# Patient Record
Sex: MALE | Race: BLACK OR AFRICAN AMERICAN | Employment: PART TIME | URBAN - METROPOLITAN AREA
[De-identification: names, ages, dates, MRNs, and addresses within clinical notes are randomized per-mention and may not be internally consistent; named-entity substitution may affect disease eponyms.]

---

## 2023-02-01 ENCOUNTER — ANESTHESIA EVENT (OUTPATIENT)
Dept: SURGERY | Age: 23
End: 2023-02-01
Payer: MEDICAID

## 2023-02-01 ENCOUNTER — HOSPITAL ENCOUNTER (INPATIENT)
Age: 23
LOS: 2 days | Discharge: HOME | End: 2023-02-03
Attending: EMERGENCY MEDICINE | Admitting: SURGERY
Payer: MEDICAID

## 2023-02-01 ENCOUNTER — ANESTHESIA (OUTPATIENT)
Dept: SURGERY | Age: 23
End: 2023-02-01
Payer: MEDICAID

## 2023-02-01 ENCOUNTER — APPOINTMENT (OUTPATIENT)
Dept: ULTRASOUND IMAGING | Age: 23
End: 2023-02-01
Attending: NURSE PRACTITIONER
Payer: MEDICAID

## 2023-02-01 ENCOUNTER — APPOINTMENT (OUTPATIENT)
Dept: CT IMAGING | Age: 23
End: 2023-02-01
Attending: NURSE PRACTITIONER
Payer: MEDICAID

## 2023-02-01 DIAGNOSIS — K61.0 PERIANAL ABSCESS: Primary | ICD-10-CM

## 2023-02-01 DIAGNOSIS — G89.18 POST-OPERATIVE PAIN: ICD-10-CM

## 2023-02-01 DIAGNOSIS — A41.9 SEPSIS WITHOUT ACUTE ORGAN DYSFUNCTION, DUE TO UNSPECIFIED ORGANISM (HCC): ICD-10-CM

## 2023-02-01 PROBLEM — K61.1 PERIRECTAL ABSCESS: Status: ACTIVE | Noted: 2023-02-01

## 2023-02-01 LAB
ALBUMIN SERPL-MCNC: 3.8 G/DL (ref 3.5–5)
ALBUMIN/GLOB SERPL: 0.7 (ref 1.1–2.2)
ALP SERPL-CCNC: 82 U/L (ref 45–117)
ALT SERPL-CCNC: 30 U/L (ref 12–78)
ANION GAP SERPL CALC-SCNC: 7 MMOL/L (ref 5–15)
APPEARANCE UR: ABNORMAL
AST SERPL W P-5'-P-CCNC: 44 U/L (ref 15–37)
BACTERIA URNS QL MICRO: NEGATIVE /HPF
BASOPHILS # BLD: 0.1 K/UL (ref 0–0.1)
BASOPHILS NFR BLD: 0 % (ref 0–1)
BILIRUB DIRECT SERPL-MCNC: 0.1 MG/DL (ref 0–0.2)
BILIRUB SERPL-MCNC: 2.1 MG/DL (ref 0.2–1)
BILIRUB UR QL: NEGATIVE
BUN SERPL-MCNC: 9 MG/DL (ref 6–20)
BUN/CREAT SERPL: 11 (ref 12–20)
CA-I BLD-MCNC: 9.7 MG/DL (ref 8.5–10.1)
CHLORIDE SERPL-SCNC: 103 MMOL/L (ref 97–108)
CO2 SERPL-SCNC: 21 MMOL/L (ref 21–32)
COLOR UR: ABNORMAL
CREAT SERPL-MCNC: 0.83 MG/DL (ref 0.7–1.3)
CRP SERPL-MCNC: 15.8 MG/DL (ref 0–0.6)
DIFFERENTIAL METHOD BLD: ABNORMAL
EOSINOPHIL # BLD: 0 K/UL (ref 0–0.4)
EOSINOPHIL NFR BLD: 0 % (ref 0–7)
EPITH CASTS URNS QL MICRO: ABNORMAL /LPF
ERYTHROCYTE [DISTWIDTH] IN BLOOD BY AUTOMATED COUNT: 12.8 % (ref 11.5–14.5)
GLOBULIN SER CALC-MCNC: 5.6 G/DL (ref 2–4)
GLUCOSE SERPL-MCNC: 117 MG/DL (ref 65–100)
GLUCOSE UR STRIP.AUTO-MCNC: NEGATIVE MG/DL
HCT VFR BLD AUTO: 42.4 % (ref 36.6–50.3)
HGB BLD-MCNC: 14.5 G/DL (ref 12.1–17)
HGB UR QL STRIP: ABNORMAL
IMM GRANULOCYTES # BLD AUTO: 0.1 K/UL (ref 0–0.04)
IMM GRANULOCYTES NFR BLD AUTO: 1 % (ref 0–0.5)
KETONES UR QL STRIP.AUTO: 40 MG/DL
LACTATE SERPL-SCNC: 1.2 MMOL/L (ref 0.4–2)
LACTATE SERPL-SCNC: 1.5 MMOL/L (ref 0.4–2)
LEUKOCYTE ESTERASE UR QL STRIP.AUTO: NEGATIVE
LYMPHOCYTES # BLD: 2 K/UL (ref 0.8–3.5)
LYMPHOCYTES NFR BLD: 13 % (ref 12–49)
MCH RBC QN AUTO: 28.8 PG (ref 26–34)
MCHC RBC AUTO-ENTMCNC: 34.2 G/DL (ref 30–36.5)
MCV RBC AUTO: 84.3 FL (ref 80–99)
MONOCYTES # BLD: 1.7 K/UL (ref 0–1)
MONOCYTES NFR BLD: 11 % (ref 5–13)
NEUTS SEG # BLD: 11.7 K/UL (ref 1.8–8)
NEUTS SEG NFR BLD: 75 % (ref 32–75)
NITRITE UR QL STRIP.AUTO: NEGATIVE
NRBC # BLD: 0 K/UL (ref 0–0.01)
NRBC BLD-RTO: 0 PER 100 WBC
PH UR STRIP: 6.5
PLATELET # BLD AUTO: 278 K/UL (ref 150–400)
PMV BLD AUTO: 9.5 FL (ref 8.9–12.9)
POTASSIUM SERPL-SCNC: 4.8 MMOL/L (ref 3.5–5.1)
PROT SERPL-MCNC: 9.4 G/DL (ref 6.4–8.2)
PROT UR STRIP-MCNC: 30 MG/DL
RBC # BLD AUTO: 5.03 M/UL (ref 4.1–5.7)
RBC #/AREA URNS HPF: ABNORMAL /HPF (ref 0–5)
SODIUM SERPL-SCNC: 131 MMOL/L (ref 136–145)
SP GR UR REFRACTOMETRY: >1.03 (ref 1–1.03)
UA: UC IF INDICATED,UAUC: ABNORMAL
UROBILINOGEN UR QL STRIP.AUTO: 1 EU/DL (ref 0.2–1)
WBC # BLD AUTO: 15.6 K/UL (ref 4.1–11.1)
WBC URNS QL MICRO: ABNORMAL /HPF (ref 0–4)

## 2023-02-01 PROCEDURE — 74011250636 HC RX REV CODE- 250/636: Performed by: NURSE PRACTITIONER

## 2023-02-01 PROCEDURE — 87040 BLOOD CULTURE FOR BACTERIA: CPT

## 2023-02-01 PROCEDURE — 74011250636 HC RX REV CODE- 250/636: Performed by: NURSE ANESTHETIST, CERTIFIED REGISTERED

## 2023-02-01 PROCEDURE — 74177 CT ABD & PELVIS W/CONTRAST: CPT

## 2023-02-01 PROCEDURE — 74011000258 HC RX REV CODE- 258: Performed by: NURSE PRACTITIONER

## 2023-02-01 PROCEDURE — 77030010509 HC AIRWY LMA MSK TELE -A: Performed by: ANESTHESIOLOGY

## 2023-02-01 PROCEDURE — 74011000636 HC RX REV CODE- 636: Performed by: EMERGENCY MEDICINE

## 2023-02-01 PROCEDURE — 76010000138 HC OR TIME 0.5 TO 1 HR: Performed by: SURGERY

## 2023-02-01 PROCEDURE — 86140 C-REACTIVE PROTEIN: CPT

## 2023-02-01 PROCEDURE — 82248 BILIRUBIN DIRECT: CPT

## 2023-02-01 PROCEDURE — 85025 COMPLETE CBC W/AUTO DIFF WBC: CPT

## 2023-02-01 PROCEDURE — 96365 THER/PROPH/DIAG IV INF INIT: CPT

## 2023-02-01 PROCEDURE — 87077 CULTURE AEROBIC IDENTIFY: CPT

## 2023-02-01 PROCEDURE — 74011000250 HC RX REV CODE- 250: Performed by: SURGERY

## 2023-02-01 PROCEDURE — 99285 EMERGENCY DEPT VISIT HI MDM: CPT

## 2023-02-01 PROCEDURE — 76210000006 HC OR PH I REC 0.5 TO 1 HR: Performed by: SURGERY

## 2023-02-01 PROCEDURE — 87185 SC STD ENZYME DETCJ PER NZM: CPT

## 2023-02-01 PROCEDURE — 45020 DRAINAGE OF RECTAL ABSCESS: CPT | Performed by: SURGERY

## 2023-02-01 PROCEDURE — 99221 1ST HOSP IP/OBS SF/LOW 40: CPT | Performed by: SURGERY

## 2023-02-01 PROCEDURE — 0D9P0ZZ DRAINAGE OF RECTUM, OPEN APPROACH: ICD-10-PCS | Performed by: SURGERY

## 2023-02-01 PROCEDURE — 96375 TX/PRO/DX INJ NEW DRUG ADDON: CPT

## 2023-02-01 PROCEDURE — 2709999900 HC NON-CHARGEABLE SUPPLY: Performed by: SURGERY

## 2023-02-01 PROCEDURE — 36415 COLL VENOUS BLD VENIPUNCTURE: CPT

## 2023-02-01 PROCEDURE — 81001 URINALYSIS AUTO W/SCOPE: CPT

## 2023-02-01 PROCEDURE — 83605 ASSAY OF LACTIC ACID: CPT

## 2023-02-01 PROCEDURE — 65270000029 HC RM PRIVATE

## 2023-02-01 PROCEDURE — 74011000250 HC RX REV CODE- 250: Performed by: NURSE ANESTHETIST, CERTIFIED REGISTERED

## 2023-02-01 PROCEDURE — 76705 ECHO EXAM OF ABDOMEN: CPT

## 2023-02-01 PROCEDURE — 74011250636 HC RX REV CODE- 250/636: Performed by: SURGERY

## 2023-02-01 PROCEDURE — 87070 CULTURE OTHR SPECIMN AEROBIC: CPT

## 2023-02-01 PROCEDURE — 77030040361 HC SLV COMPR DVT MDII -B: Performed by: SURGERY

## 2023-02-01 PROCEDURE — 74011250637 HC RX REV CODE- 250/637: Performed by: SURGERY

## 2023-02-01 PROCEDURE — 76060000032 HC ANESTHESIA 0.5 TO 1 HR: Performed by: SURGERY

## 2023-02-01 PROCEDURE — 80053 COMPREHEN METABOLIC PANEL: CPT

## 2023-02-01 RX ORDER — KETOROLAC TROMETHAMINE 30 MG/ML
15 INJECTION, SOLUTION INTRAMUSCULAR; INTRAVENOUS ONCE
Status: COMPLETED | OUTPATIENT
Start: 2023-02-01 | End: 2023-02-01

## 2023-02-01 RX ORDER — MIDAZOLAM HYDROCHLORIDE 1 MG/ML
0.5 INJECTION, SOLUTION INTRAMUSCULAR; INTRAVENOUS
Status: DISCONTINUED | OUTPATIENT
Start: 2023-02-01 | End: 2023-02-01 | Stop reason: HOSPADM

## 2023-02-01 RX ORDER — LIDOCAINE HYDROCHLORIDE 20 MG/ML
INJECTION, SOLUTION EPIDURAL; INFILTRATION; INTRACAUDAL; PERINEURAL AS NEEDED
Status: DISCONTINUED | OUTPATIENT
Start: 2023-02-01 | End: 2023-02-01 | Stop reason: HOSPADM

## 2023-02-01 RX ORDER — SODIUM CHLORIDE 0.9 % (FLUSH) 0.9 %
5-40 SYRINGE (ML) INJECTION AS NEEDED
Status: DISCONTINUED | OUTPATIENT
Start: 2023-02-01 | End: 2023-02-03 | Stop reason: HOSPADM

## 2023-02-01 RX ORDER — ACETAMINOPHEN 325 MG/1
650 TABLET ORAL
Status: DISCONTINUED | OUTPATIENT
Start: 2023-02-01 | End: 2023-02-03 | Stop reason: HOSPADM

## 2023-02-01 RX ORDER — DEXTROSE, SODIUM CHLORIDE, SODIUM LACTATE, POTASSIUM CHLORIDE, AND CALCIUM CHLORIDE 5; .6; .31; .03; .02 G/100ML; G/100ML; G/100ML; G/100ML; G/100ML
125 INJECTION, SOLUTION INTRAVENOUS CONTINUOUS
Status: DISCONTINUED | OUTPATIENT
Start: 2023-02-01 | End: 2023-02-01

## 2023-02-01 RX ORDER — ONDANSETRON 2 MG/ML
4 INJECTION INTRAMUSCULAR; INTRAVENOUS
Status: DISCONTINUED | OUTPATIENT
Start: 2023-02-01 | End: 2023-02-03 | Stop reason: HOSPADM

## 2023-02-01 RX ORDER — METRONIDAZOLE 500 MG/100ML
500 INJECTION, SOLUTION INTRAVENOUS EVERY 8 HOURS
Status: DISCONTINUED | OUTPATIENT
Start: 2023-02-01 | End: 2023-02-03 | Stop reason: HOSPADM

## 2023-02-01 RX ORDER — DIPHENHYDRAMINE HYDROCHLORIDE 50 MG/ML
12.5 INJECTION, SOLUTION INTRAMUSCULAR; INTRAVENOUS AS NEEDED
Status: DISCONTINUED | OUTPATIENT
Start: 2023-02-01 | End: 2023-02-01 | Stop reason: HOSPADM

## 2023-02-01 RX ORDER — FENTANYL CITRATE 50 UG/ML
50 INJECTION, SOLUTION INTRAMUSCULAR; INTRAVENOUS
Status: DISCONTINUED | OUTPATIENT
Start: 2023-02-01 | End: 2023-02-01 | Stop reason: HOSPADM

## 2023-02-01 RX ORDER — SODIUM CHLORIDE 0.9 % (FLUSH) 0.9 %
5-40 SYRINGE (ML) INJECTION EVERY 8 HOURS
Status: DISCONTINUED | OUTPATIENT
Start: 2023-02-01 | End: 2023-02-03 | Stop reason: HOSPADM

## 2023-02-01 RX ORDER — ONDANSETRON 2 MG/ML
4 INJECTION INTRAMUSCULAR; INTRAVENOUS ONCE
Status: COMPLETED | OUTPATIENT
Start: 2023-02-01 | End: 2023-02-01

## 2023-02-01 RX ORDER — SODIUM CHLORIDE 0.9 % (FLUSH) 0.9 %
5-10 SYRINGE (ML) INJECTION AS NEEDED
Status: DISCONTINUED | OUTPATIENT
Start: 2023-02-01 | End: 2023-02-03 | Stop reason: HOSPADM

## 2023-02-01 RX ORDER — PROPOFOL 10 MG/ML
INJECTION, EMULSION INTRAVENOUS AS NEEDED
Status: DISCONTINUED | OUTPATIENT
Start: 2023-02-01 | End: 2023-02-01 | Stop reason: HOSPADM

## 2023-02-01 RX ORDER — KETOROLAC TROMETHAMINE 30 MG/ML
30 INJECTION, SOLUTION INTRAMUSCULAR; INTRAVENOUS
Status: DISCONTINUED | OUTPATIENT
Start: 2023-02-01 | End: 2023-02-02

## 2023-02-01 RX ORDER — ONDANSETRON 2 MG/ML
4 INJECTION INTRAMUSCULAR; INTRAVENOUS AS NEEDED
Status: DISCONTINUED | OUTPATIENT
Start: 2023-02-01 | End: 2023-02-01 | Stop reason: HOSPADM

## 2023-02-01 RX ORDER — SODIUM CHLORIDE 0.9 % (FLUSH) 0.9 %
5-40 SYRINGE (ML) INJECTION AS NEEDED
Status: CANCELLED | OUTPATIENT
Start: 2023-02-01

## 2023-02-01 RX ORDER — SODIUM CHLORIDE 0.9 % (FLUSH) 0.9 %
5-40 SYRINGE (ML) INJECTION EVERY 8 HOURS
Status: CANCELLED | OUTPATIENT
Start: 2023-02-01

## 2023-02-01 RX ORDER — NALOXONE HYDROCHLORIDE 0.4 MG/ML
0.4 INJECTION, SOLUTION INTRAMUSCULAR; INTRAVENOUS; SUBCUTANEOUS AS NEEDED
Status: DISCONTINUED | OUTPATIENT
Start: 2023-02-01 | End: 2023-02-03 | Stop reason: HOSPADM

## 2023-02-01 RX ORDER — MORPHINE SULFATE 2 MG/ML
2 INJECTION, SOLUTION INTRAMUSCULAR; INTRAVENOUS
Status: DISCONTINUED | OUTPATIENT
Start: 2023-02-01 | End: 2023-02-01 | Stop reason: HOSPADM

## 2023-02-01 RX ORDER — SODIUM CHLORIDE 0.9 % (FLUSH) 0.9 %
5-40 SYRINGE (ML) INJECTION EVERY 8 HOURS
Status: DISCONTINUED | OUTPATIENT
Start: 2023-02-02 | End: 2023-02-01 | Stop reason: HOSPADM

## 2023-02-01 RX ORDER — MORPHINE SULFATE 4 MG/ML
4 INJECTION INTRAVENOUS
Status: DISCONTINUED | OUTPATIENT
Start: 2023-02-01 | End: 2023-02-03 | Stop reason: HOSPADM

## 2023-02-01 RX ORDER — LIDOCAINE HYDROCHLORIDE 10 MG/ML
0.1 INJECTION, SOLUTION EPIDURAL; INFILTRATION; INTRACAUDAL; PERINEURAL AS NEEDED
Status: CANCELLED | OUTPATIENT
Start: 2023-02-01

## 2023-02-01 RX ORDER — ENOXAPARIN SODIUM 100 MG/ML
40 INJECTION SUBCUTANEOUS EVERY 24 HOURS
Status: DISCONTINUED | OUTPATIENT
Start: 2023-02-01 | End: 2023-02-03 | Stop reason: HOSPADM

## 2023-02-01 RX ORDER — BUPIVACAINE HYDROCHLORIDE 2.5 MG/ML
INJECTION, SOLUTION EPIDURAL; INFILTRATION; INTRACAUDAL AS NEEDED
Status: DISCONTINUED | OUTPATIENT
Start: 2023-02-01 | End: 2023-02-01 | Stop reason: HOSPADM

## 2023-02-01 RX ORDER — MIDAZOLAM HYDROCHLORIDE 1 MG/ML
1 INJECTION, SOLUTION INTRAMUSCULAR; INTRAVENOUS AS NEEDED
Status: CANCELLED | OUTPATIENT
Start: 2023-02-01

## 2023-02-01 RX ORDER — OXYCODONE AND ACETAMINOPHEN 5; 325 MG/1; MG/1
1 TABLET ORAL AS NEEDED
Status: DISCONTINUED | OUTPATIENT
Start: 2023-02-01 | End: 2023-02-01 | Stop reason: HOSPADM

## 2023-02-01 RX ORDER — ACETAMINOPHEN 325 MG/1
650 TABLET ORAL
Status: DISCONTINUED | OUTPATIENT
Start: 2023-02-01 | End: 2023-02-01

## 2023-02-01 RX ORDER — OXYCODONE HYDROCHLORIDE 5 MG/1
5 TABLET ORAL
Status: DISCONTINUED | OUTPATIENT
Start: 2023-02-01 | End: 2023-02-03 | Stop reason: HOSPADM

## 2023-02-01 RX ORDER — OXYCODONE HYDROCHLORIDE 10 MG/1
10 TABLET ORAL
Status: DISCONTINUED | OUTPATIENT
Start: 2023-02-01 | End: 2023-02-03 | Stop reason: HOSPADM

## 2023-02-01 RX ORDER — MIDAZOLAM HYDROCHLORIDE 1 MG/ML
INJECTION, SOLUTION INTRAMUSCULAR; INTRAVENOUS AS NEEDED
Status: DISCONTINUED | OUTPATIENT
Start: 2023-02-01 | End: 2023-02-01 | Stop reason: HOSPADM

## 2023-02-01 RX ORDER — SODIUM CHLORIDE 0.9 % (FLUSH) 0.9 %
5-40 SYRINGE (ML) INJECTION AS NEEDED
Status: DISCONTINUED | OUTPATIENT
Start: 2023-02-01 | End: 2023-02-01 | Stop reason: HOSPADM

## 2023-02-01 RX ORDER — MORPHINE SULFATE 4 MG/ML
4 INJECTION INTRAVENOUS ONCE
Status: COMPLETED | OUTPATIENT
Start: 2023-02-01 | End: 2023-02-01

## 2023-02-01 RX ORDER — FENTANYL CITRATE 50 UG/ML
50 INJECTION, SOLUTION INTRAMUSCULAR; INTRAVENOUS AS NEEDED
Status: CANCELLED | OUTPATIENT
Start: 2023-02-01

## 2023-02-01 RX ORDER — EPHEDRINE SULFATE/0.9% NACL/PF 50 MG/5 ML
5 SYRINGE (ML) INTRAVENOUS AS NEEDED
Status: DISCONTINUED | OUTPATIENT
Start: 2023-02-01 | End: 2023-02-01 | Stop reason: HOSPADM

## 2023-02-01 RX ORDER — HYDROMORPHONE HYDROCHLORIDE 1 MG/ML
INJECTION, SOLUTION INTRAMUSCULAR; INTRAVENOUS; SUBCUTANEOUS AS NEEDED
Status: DISCONTINUED | OUTPATIENT
Start: 2023-02-01 | End: 2023-02-01 | Stop reason: HOSPADM

## 2023-02-01 RX ORDER — ONDANSETRON 2 MG/ML
INJECTION INTRAMUSCULAR; INTRAVENOUS AS NEEDED
Status: DISCONTINUED | OUTPATIENT
Start: 2023-02-01 | End: 2023-02-01 | Stop reason: HOSPADM

## 2023-02-01 RX ORDER — HYDROMORPHONE HYDROCHLORIDE 1 MG/ML
0.5 INJECTION, SOLUTION INTRAMUSCULAR; INTRAVENOUS; SUBCUTANEOUS
Status: DISCONTINUED | OUTPATIENT
Start: 2023-02-01 | End: 2023-02-01 | Stop reason: HOSPADM

## 2023-02-01 RX ORDER — SODIUM CHLORIDE, SODIUM LACTATE, POTASSIUM CHLORIDE, CALCIUM CHLORIDE 600; 310; 30; 20 MG/100ML; MG/100ML; MG/100ML; MG/100ML
INJECTION, SOLUTION INTRAVENOUS
Status: DISCONTINUED | OUTPATIENT
Start: 2023-02-01 | End: 2023-02-01 | Stop reason: HOSPADM

## 2023-02-01 RX ADMIN — PIPERACILLIN SODIUM AND TAZOBACTAM SODIUM 3.38 G: 3; .375 INJECTION, POWDER, LYOPHILIZED, FOR SOLUTION INTRAVENOUS at 12:44

## 2023-02-01 RX ADMIN — OXYCODONE 5 MG: 5 TABLET ORAL at 23:28

## 2023-02-01 RX ADMIN — MORPHINE SULFATE 4 MG: 4 INJECTION, SOLUTION INTRAMUSCULAR; INTRAVENOUS at 16:48

## 2023-02-01 RX ADMIN — ONDANSETRON 4 MG: 2 INJECTION INTRAMUSCULAR; INTRAVENOUS at 21:57

## 2023-02-01 RX ADMIN — PIPERACILLIN SODIUM AND TAZOBACTAM SODIUM 3.38 G: 3; .375 INJECTION, POWDER, LYOPHILIZED, FOR SOLUTION INTRAVENOUS at 22:11

## 2023-02-01 RX ADMIN — LIDOCAINE HYDROCHLORIDE 100 MG: 20 INJECTION, SOLUTION EPIDURAL; INFILTRATION; INTRACAUDAL; PERINEURAL at 22:02

## 2023-02-01 RX ADMIN — PIPERACILLIN SODIUM AND TAZOBACTAM SODIUM 3.38 G: 3; .375 INJECTION, POWDER, LYOPHILIZED, FOR SOLUTION INTRAVENOUS at 19:50

## 2023-02-01 RX ADMIN — SODIUM CHLORIDE 1000 ML: 9 INJECTION, SOLUTION INTRAVENOUS at 12:37

## 2023-02-01 RX ADMIN — MORPHINE SULFATE 4 MG: 4 INJECTION, SOLUTION INTRAMUSCULAR; INTRAVENOUS at 11:47

## 2023-02-01 RX ADMIN — HYDROMORPHONE HYDROCHLORIDE 1 MG: 1 INJECTION, SOLUTION INTRAMUSCULAR; INTRAVENOUS; SUBCUTANEOUS at 22:25

## 2023-02-01 RX ADMIN — SODIUM CHLORIDE 655 ML: 9 INJECTION, SOLUTION INTRAVENOUS at 14:45

## 2023-02-01 RX ADMIN — KETOROLAC TROMETHAMINE 30 MG: 30 INJECTION, SOLUTION INTRAMUSCULAR; INTRAVENOUS at 19:45

## 2023-02-01 RX ADMIN — SODIUM CHLORIDE, POTASSIUM CHLORIDE, SODIUM LACTATE AND CALCIUM CHLORIDE: 600; 310; 30; 20 INJECTION, SOLUTION INTRAVENOUS at 21:41

## 2023-02-01 RX ADMIN — SODIUM CHLORIDE, SODIUM LACTATE, POTASSIUM CHLORIDE, CALCIUM CHLORIDE AND DEXTROSE MONOHYDRATE 125 ML/HR: 5; 600; 310; 30; 20 INJECTION, SOLUTION INTRAVENOUS at 15:48

## 2023-02-01 RX ADMIN — SODIUM CHLORIDE 1000 ML: 9 INJECTION, SOLUTION INTRAVENOUS at 13:18

## 2023-02-01 RX ADMIN — ONDANSETRON 4 MG: 2 INJECTION INTRAMUSCULAR; INTRAVENOUS at 11:47

## 2023-02-01 RX ADMIN — IOPAMIDOL 100 ML: 755 INJECTION, SOLUTION INTRAVENOUS at 12:09

## 2023-02-01 RX ADMIN — ENOXAPARIN SODIUM 40 MG: 100 INJECTION SUBCUTANEOUS at 15:48

## 2023-02-01 RX ADMIN — LIDOCAINE HYDROCHLORIDE 100 MG: 20 INJECTION, SOLUTION EPIDURAL; INFILTRATION; INTRACAUDAL; PERINEURAL at 22:19

## 2023-02-01 RX ADMIN — SODIUM CHLORIDE, PRESERVATIVE FREE 5 ML: 5 INJECTION INTRAVENOUS at 15:00

## 2023-02-01 RX ADMIN — MIDAZOLAM HYDROCHLORIDE 2 MG: 2 INJECTION, SOLUTION INTRAMUSCULAR; INTRAVENOUS at 21:57

## 2023-02-01 RX ADMIN — PROPOFOL 200 MG: 10 INJECTION, EMULSION INTRAVENOUS at 22:02

## 2023-02-01 RX ADMIN — KETOROLAC TROMETHAMINE 15 MG: 30 INJECTION, SOLUTION INTRAMUSCULAR; INTRAVENOUS at 12:42

## 2023-02-01 RX ADMIN — METRONIDAZOLE 500 MG: 500 INJECTION, SOLUTION INTRAVENOUS at 19:50

## 2023-02-01 RX ADMIN — PROPOFOL 200 MG: 10 INJECTION, EMULSION INTRAVENOUS at 22:22

## 2023-02-01 NOTE — ED PROVIDER NOTES
[facility] EMERGENCY DEPT  EMERGENCY DEPARTMENT HISTORY AND PHYSICAL EXAM      Date: 2/1/2023  Patient Name: Charissa Maravilla  MRN: [id number]  [place]: 2000  Date of evaluation: 2/1/2023  Provider: Roby Moseley NP   Note Started: 10:15 AM 2/1/23    HISTORY OF PRESENT ILLNESS     Chief Complaint   Patient presents with    Anal Pain       History Provided By: Patient    HPI: Charissa Maravilla, 23 y.o. male no significant or chronic past medical history other history is as below presents with 3-day history of anal pain greatest after having a bowel movement with no visible blood in his stool or on toilet paper when wiping. States he is checked himself and does not see hemorrhoids or lesions in has never had any problems with hemorrhoids or STDs. Denies history of abscesses. States the pain is high up inside with no alleviating factors or measures attempted or identified. It is exacerbated by sitting down or any pressure to his rectal area. He denies fever, abdominal pain, urinary symptoms, penile pain scrotal pain or swelling or any suspicion of STDs. Prior to onset of symptoms has been in his usual state of wellness and has no history of the symptoms. PAST MEDICAL HISTORY   Past Medical History:  History reviewed. No pertinent past medical history. Past Surgical History:  History reviewed. No pertinent surgical history. Family History:  History reviewed. No pertinent family history. Social History:  Social History     Tobacco Use    Smoking status: Never   Substance Use Topics    Alcohol use: No    Drug use: No       Allergies:  No Known Allergies    PCP: None    Current Meds:   Previous Medications    IBUPROFEN (ADVIL;MOTRIN) 100 MG/5 ML SUSPENSION    Take 31 mL by mouth every six (6) hours as needed. REVIEW OF SYSTEMS   Review of Systems   Constitutional: Negative. HENT: Negative. Eyes: Negative. Respiratory: Negative. Cardiovascular: Negative.     Gastrointestinal:  Positive for rectal pain. Negative for abdominal pain, anal bleeding, blood in stool, constipation, diarrhea, nausea and vomiting. Genitourinary: Negative. Musculoskeletal: Negative. Skin: Negative. Allergic/Immunologic: Negative. Neurological: Negative. Hematological: Negative. Psychiatric/Behavioral: Negative. All other systems reviewed and are negative. Positives and Pertinent negatives as per HPI. PHYSICAL EXAM     ED Triage Vitals [02/01/23 0954]   ED Encounter Vitals Group      BP (!) 143/83      Pulse (Heart Rate) (!) 111      Resp Rate 18      Temp 97.8 °F (36.6 °C)      Temp src       O2 Sat (%) 98 %      Weight 195 lb      Height 5' 10\"      Physical Exam  Vitals and nursing note reviewed. Exam conducted with a chaperone present. Constitutional:       General: He is not in acute distress. Appearance: Normal appearance. He is normal weight. He is not ill-appearing, toxic-appearing or diaphoretic. HENT:      Head: Normocephalic. Mouth/Throat:      Mouth: Mucous membranes are moist.   Eyes:      Conjunctiva/sclera: Conjunctivae normal.   Cardiovascular:      Rate and Rhythm: Regular rhythm. Tachycardia present. Heart sounds: Normal heart sounds. Pulmonary:      Effort: Pulmonary effort is normal. No respiratory distress. Breath sounds: Normal breath sounds. Abdominal:      General: Bowel sounds are normal. There is no distension. Palpations: Abdomen is soft. Tenderness: There is no abdominal tenderness. There is no guarding or rebound. Genitourinary:     Penis: Normal.       Testes: Normal.      Rectum: Tenderness present. No anal fissure, external hemorrhoid or internal hemorrhoid. Normal anal tone. Comments: Mild erythema at top of anus without obvious induration or fluctuation or abscess and does not elicit pain when palpated or assessed  Musculoskeletal:         General: Normal range of motion. Cervical back: Neck supple.    Skin:     General: Skin is warm and dry. Capillary Refill: Capillary refill takes less than 2 seconds. Neurological:      General: No focal deficit present. Mental Status: He is alert and oriented to person, place, and time. Psychiatric:         Behavior: Behavior normal.       SCREENINGS               No data recorded        LAB, EKG AND DIAGNOSTIC RESULTS   Labs:  Recent Results (from the past 12 hour(s))   METABOLIC PANEL, COMPREHENSIVE    Collection Time: 02/01/23 10:29 AM   Result Value Ref Range    Sodium 131 (L) 136 - 145 mmol/L    Potassium 4.8 3.5 - 5.1 mmol/L    Chloride 103 97 - 108 mmol/L    CO2 21 21 - 32 mmol/L    Anion gap 7 5 - 15 mmol/L    Glucose 117 (H) 65 - 100 mg/dL    BUN 9 6 - 20 mg/dL    Creatinine 0.83 0.70 - 1.30 mg/dL    BUN/Creatinine ratio 11 (L) 12 - 20      eGFR >60 >60 ml/min/1.73m2    Calcium 9.7 8.5 - 10.1 mg/dL    Bilirubin, total 2.1 (H) 0.2 - 1.0 mg/dL    AST (SGOT) 44 (H) 15 - 37 U/L    ALT (SGPT) 30 12 - 78 U/L    Alk.  phosphatase 82 45 - 117 U/L    Protein, total 9.4 (H) 6.4 - 8.2 g/dL    Albumin 3.8 3.5 - 5.0 g/dL    Globulin 5.6 (H) 2.0 - 4.0 g/dL    A-G Ratio 0.7 (L) 1.1 - 2.2     C REACTIVE PROTEIN, QT    Collection Time: 02/01/23 10:29 AM   Result Value Ref Range    C-Reactive protein 15.80 (H) 0.00 - 0.60 mg/dL   URINALYSIS W/ REFLEX CULTURE    Collection Time: 02/01/23 10:29 AM    Specimen: Urine   Result Value Ref Range    Color Yellow/Straw      Appearance Hazy (A) Clear      Specific gravity >1.030 (H) 1.003 - 1.030    pH (UA) 6.5      Protein 30 (A) Negative mg/dL    Glucose Negative Negative mg/dL    Ketone 40 (A) Negative mg/dL    Bilirubin Negative Negative      Blood Small (A) Negative      Urobilinogen 1.0 0.2 - 1.0 EU/dL    Nitrites Negative Negative      Leukocyte Esterase Negative Negative      UA:UC IF INDICATED Culture not indicated by UA result Culture not indicated by UA result      WBC 0-4 0 - 4 /hpf    RBC 0-5 0 - 5 /hpf    Epithelial cells Few Few /lpf Bacteria Negative Negative /hpf   BILIRUBIN, DIRECT    Collection Time: 02/01/23 10:29 AM   Result Value Ref Range    Bilirubin, direct 0.1 0.0 - 0.2 mg/dL   CBC WITH AUTOMATED DIFF    Collection Time: 02/01/23 11:49 AM   Result Value Ref Range    WBC 15.6 (H) 4.1 - 11.1 K/uL    RBC 5.03 4. 10 - 5.70 M/uL    HGB 14.5 12.1 - 17.0 g/dL    HCT 42.4 36.6 - 50.3 %    MCV 84.3 80.0 - 99.0 FL    MCH 28.8 26.0 - 34.0 PG    MCHC 34.2 30.0 - 36.5 g/dL    RDW 12.8 11.5 - 14.5 %    PLATELET 183 139 - 704 K/uL    MPV 9.5 8.9 - 12.9 FL    NRBC 0.0 0.0  WBC    ABSOLUTE NRBC 0.00 0.00 - 0.01 K/uL    NEUTROPHILS 75 32 - 75 %    LYMPHOCYTES 13 12 - 49 %    MONOCYTES 11 5 - 13 %    EOSINOPHILS 0 0 - 7 %    BASOPHILS 0 0 - 1 %    IMMATURE GRANULOCYTES 1 (H) 0 - 0.5 %    ABS. NEUTROPHILS 11.7 (H) 1.8 - 8.0 K/UL    ABS. LYMPHOCYTES 2.0 0.8 - 3.5 K/UL    ABS. MONOCYTES 1.7 (H) 0.0 - 1.0 K/UL    ABS. EOSINOPHILS 0.0 0.0 - 0.4 K/UL    ABS. BASOPHILS 0.1 0.0 - 0.1 K/UL    ABS. IMM. GRANS. 0.1 (H) 0.00 - 0.04 K/UL    DF AUTOMATED     LACTIC ACID    Collection Time: 02/01/23 12:36 PM   Result Value Ref Range    Lactic acid 1.2 0.4 - 2.0 mmol/L       EKG: No indication for EKG    Radiologic Studies:  Non-plain film images such as CT, Ultrasound and MRI are read by the radiologist. Plain radiographic images are visualized and preliminarily interpreted by the ED Provider with the below findings:      Interpretation per the Radiologist below, if available at the time of this note:  CT ABD PELV W CONT    Result Date: 2/1/2023  EXAM: CT ABD PELV W CONT INDICATION: R/O abscess . 3 day history of anal pain COMPARISON: None CONTRAST: 100 mL of Isovue-370. ORAL CONTRAST: None TECHNIQUE: Following the uneventful intravenous administration of contrast, thin axial images were obtained through the abdomen and pelvis. Coronal and sagittal reconstructions were generated.  CT dose reduction was achieved through use of a standardized protocol tailored for this examination and automatic exposure control for dose modulation. FINDINGS: LOWER THORAX: No significant abnormality in the incidentally imaged lower chest. LIVER: No mass. BILIARY TREE: Gallbladder is within normal limits. CBD is not dilated. SPLEEN: within normal limits. PANCREAS: No mass or ductal dilatation. ADRENALS: Unremarkable. KIDNEYS: No mass, calculus, or hydronephrosis. STOMACH: Unremarkable. SMALL BOWEL: No dilatation or wall thickening. COLON: No dilatation or wall thickening. There is a T-shaped abscess collection posterior to the anus measuring 3.3 x 3.9 x 5.5 cm APPENDIX: Not enlarged PERITONEUM: No ascites or pneumoperitoneum. RETROPERITONEUM: No lymphadenopathy or aortic aneurysm. REPRODUCTIVE ORGANS: Not enlarged URINARY BLADDER: No mass or calculus. BONES: No destructive bone lesion. ABDOMINAL WALL: No mass or hernia. ADDITIONAL COMMENTS: N/A     1. Perianal abscess      PROCEDURES   Unless otherwise noted below, none. Performed by: Noel Potts NP   Procedures      CRITICAL CARE TIME   Sepsis Reassessment:    The patient meets criteria for Sepsis due to a suspected source being Intra-abdominal and Skin/Bone/Joint  Cultures and antibiotics were initiated sequentially and appropriately as per orders. Fluid resuscitation volume with 30ml/kg crystalloid bolus was: NOT INDICATED: the patient did NOT meet criteria for severe sepsis or septic shock OR had any hypotensive blood pressure readings at any point during their evaluation. A crystalloid bolus of 2655 ml was given for sepsis. I have performed a sepsis reassessment of the patient's clinical volume status and tissue perfusion after the final volume of target fluid was initiated, at 1450, and the patient is adequately resuscitated.        ED COURSE and DIFFERENTIAL DIAGNOSIS/MDM   Vitals:    Vitals:    02/01/23 0954 02/01/23 1259 02/01/23 1323   BP: (!) 143/83 (!) 141/103 131/78   Pulse: (!) 111  93   Resp: 18  18   Temp: 97.8 °F (36.6 °C)  99.7 °F (37.6 °C)   SpO2: 98% 99% 98%   Weight: 88.5 kg (195 lb)     Height: 5' 10\" (1.778 m)          Patient was given the following medications:  Medications   sodium chloride (NS) flush 5-10 mL (has no administration in time range)   piperacillin-tazobactam (ZOSYN) 3.375 g in 0.9% sodium chloride (MBP/ADV) 100 mL MBP (0 g IntraVENous IV Completed 2/1/23 1318)   sodium chloride 0.9 % bolus infusion 1,000 mL (0 mL IntraVENous IV Completed 2/1/23 1318)     Followed by   sodium chloride 0.9 % bolus infusion 1,000 mL (1,000 mL IntraVENous New Bag 2/1/23 1318)     Followed by   sodium chloride 0.9 % bolus infusion 655 mL (has no administration in time range)   sodium chloride (NS) flush 5-40 mL (has no administration in time range)   sodium chloride (NS) flush 5-40 mL (has no administration in time range)   dextrose 5% lactated ringers infusion (has no administration in time range)   ketorolac (TORADOL) injection 30 mg (has no administration in time range)   morphine injection 4 mg (has no administration in time range)   naloxone (NARCAN) injection 0.4 mg (has no administration in time range)   ondansetron (ZOFRAN) injection 4 mg (has no administration in time range)   enoxaparin (LOVENOX) injection 40 mg (has no administration in time range)   cefTRIAXone (ROCEPHIN) 2 g in sterile water (preservative free) 20 mL IV syringe (has no administration in time range)   metroNIDAZOLE (FLAGYL) IVPB premix 500 mg (has no administration in time range)   ketorolac (TORADOL) injection 15 mg (15 mg IntraVENous Given 2/1/23 1242)   morphine injection 4 mg (4 mg IntraVENous Given 2/1/23 1147)   ondansetron (ZOFRAN) injection 4 mg (4 mg IntraVENous Given 2/1/23 1147)   iopamidoL (ISOVUE-370) 370 mg iodine /mL (76 %) injection 100 mL (100 mL IntraVENous Given 2/1/23 1209)       CONSULTS: (Who and What was discussed)  IP CONSULT TO GENERAL SURGERY     Chronic Conditions: None  Social Determinants affecting Dx or Tx: None Records Reviewed (source and summary of external notes): Nursing notes    CC/HPI Summary, DDx, ED Course, and Reassessment: Presents with anal pain. Mildly hypertensive and tachycardic but otherwise no abnormal vital signs including no fever, tachypnea or hypoxia. Patient states he is in pain and is making him anxious but is otherwise asymptomatic of hypertension or any evidence of systemic illness. No acute abnormalities on physical exam and pain elicited with attempted internal exam.  Differentials include internal hemorrhoid, perirectal abscess, perianal abscess, less likely STI or proctitis or prostate abnormality given absence of urinary symptoms. No visible blood in stool and no history of constipation or stool abnormalities per patient concerning for bowel obstruction or fecal impaction. Will obtain CT and labs to assess for any obvious abscess given absence of any abnormality on visual or physical examination and patient is in agreement. Will medicate with analgesia and start with Toradol and reassess for effectiveness    ED Course as of 02/01/23 1452   Wed Feb 01, 2023   1134 C REACTIVE PROTEIN, QT(!):    C-Reactive protein 15.80(!)  Inflammatory markers are extremely elevated. CMP with no acute electrolyte or metabolic disturbance. Bilirubin 2.1 but patient has no abdominal pain or right upper quadrant pain. AST of 44 but no other abnormal liver function. Will change to abdomen and pelvis [KR]   1145 Due to difficulty obtaining IV access patient's pain is increased and will now give morphine and Zofran. [KR]   1204 WBC(!): 15.6  CBC with elevated white blood cell count, hemoglobin hematocrit normal, absolute neutrophils elevated 11.7 otherwise unremarkable CBC. Will add lactic acid as well as administer IV fluids and plan for IV antibiotics pending CT. Patient meets SIRS criteria at heart rate on arrival of 111, WBC 15.6.   Will initiate sepsis orders and add blood cultures as well and antibiotic therapy. [KR]   1308 Perianal abscess on CT. Will consult surgery. [KR]   1311 LACTIC ACID:    Lactic acid 1.2 [KR]      ED Course User Index  [KR] Natali Joaquin NP       Disposition Considerations (Tests not done, Shared Decision Making, Pt Expectation of Test or Treatment.): Admitted to surgery. Additional diagnostic of RUQ US ordered for elevated bilirubin with normal GB and CBD findings on CT after discussion with surgery and patient agreeable to all. FINAL IMPRESSION     1. Perianal abscess    2. Sepsis without acute organ dysfunction, due to unspecified organism [facility])          DISPOSITION/PLAN   Admitted    Admit Note: Pt is being admitted by Dr. Jose Serna. The results of their tests and reason(s) for their admission have been discussed with pt and/or available family. They convey agreement and understanding for the need to be admitted and for the admission diagnosis. I am the Primary Clinician of Record: Rajwinder Rangel NP (electronically signed)    (Please note that parts of this dictation were completed with voice recognition software. Quite often unanticipated grammatical, syntax, homophones, and other interpretive errors are inadvertently transcribed by the computer software. Please disregards these errors.  Please excuse any errors that have escaped final proofreading.)

## 2023-02-01 NOTE — H&P
3 [facility] Consultation for: perirectal abscess    Requesting Physician: Lennie Harris NP    History of Present Illness:      Fermin Yun is a 23 y.o. male who presented to the ED with about a 3-day history of pain and swelling near the rectum. The pain is gotten progressively worse. He initially had constipation, but was able to have a bowel movement earlier today. He denies fever or chills. He denies any drainage from the area. He has not had similar problems in the past.    History reviewed. No pertinent past medical history. History reviewed. No pertinent surgical history. Social History     Socioeconomic History    Marital status: SINGLE     Spouse name: Not on file    Number of children: Not on file    Years of education: Not on file    Highest education level: Not on file   Occupational History    Not on file   Tobacco Use    Smoking status: Never    Smokeless tobacco: Not on file   Substance and Sexual Activity    Alcohol use: No    Drug use: No    Sexual activity: Not on file   Other Topics Concern    Not on file   Social History Narrative    Not on file     Social Determinants of Health     Financial Resource Strain: Not on file   Food Insecurity: Not on file   Transportation Needs: Not on file   Physical Activity: Not on file   Stress: Not on file   Social Connections: Not on file   Intimate Partner Violence: Not on file   Housing Stability: Not on file       History reviewed. No pertinent family history.       Current Facility-Administered Medications:     sodium chloride (NS) flush 5-10 mL, 5-10 mL, IntraVENous, PRN, Vikash Lake NP    piperacillin-tazobactam (ZOSYN) 3.375 g in 0.9% sodium chloride (MBP/ADV) 100 mL MBP, 3.375 g, IntraVENous, Q8H, Vikash Lake NP, IV Completed at 02/01/23 1318    sodium chloride (NS) flush 5-40 mL, 5-40 mL, IntraVENous, Q8H, Eagle Siegel MD, 5 mL at 02/01/23 1500    sodium chloride (NS) flush 5-40 mL, 5-40 mL, IntraVENous, PRN, Licha Robb MD    dextrose 5% lactated ringers infusion, 125 mL/hr, IntraVENous, CONTINUOUS, Alcira Worley MD, Last Rate: 125 mL/hr at 02/01/23 1548, 125 mL/hr at 02/01/23 1548    ketorolac (TORADOL) injection 30 mg, 30 mg, IntraVENous, Q6H PRN, Licha Robb MD    morphine injection 4 mg, 4 mg, IntraVENous, Q4H PRN, Alcira Worley MD, 4 mg at 02/01/23 1648    naloxone (NARCAN) injection 0.4 mg, 0.4 mg, IntraVENous, PRN, Licha Robb MD    ondansetron [facility]) injection 4 mg, 4 mg, IntraVENous, Q4H PRN, Licha Robb MD    enoxaparin (LOVENOX) injection 40 mg, 40 mg, SubCUTAneous, Q24H, Licha Siegel MD, 40 mg at 02/01/23 1548    [START ON 2/2/2023] cefTRIAXone (ROCEPHIN) 2 g in sterile water (preservative free) 20 mL IV syringe, 2 g, IntraVENous, Q24H, Licha Siegel MD    metroNIDAZOLE (FLAGYL) IVPB premix 500 mg, 500 mg, IntraVENous, Q8H, Licha Siegel MD    No Known Allergies    ROS   Constitutional: negative  Ears, Nose, Mouth, Throat, and Face: negative  Respiratory: negative  Cardiovascular: negative  Gastrointestinal:  as above, otherwise negative  Genitourinary:negative  Integument/Breast: negative  Hematologic/Lymphatic: negative  Behavioral/Psychiatric: negative  Allergic/Immunologic: negative      Physical Exam:     Visit Vitals  BP (!) 140/88 (BP 1 Location: Left arm, BP Patient Position: Supine)   Pulse 67   Temp 98 °F (36.7 °C)   Resp 18   Ht 5' 10\" (1.778 m)   Wt 195 lb (88.5 kg)   SpO2 100%   BMI 27.98 kg/m²       General - alert and oriented, no apparent distress, well developed  HEENT - NC/AT, no scleral icterus  Pulm - CTAB, normal inspiratory effort  CV - RRR, no M/R/G  Abd - soft, NT ND  Rectal - area of fluctuance  posterior to anus, TTP, no masses on rectal exam  Ext - warm, well perfused, no edema  Skin - supple and no rashes  Psychiatric - normal affect, good mood    Labs  Recent Results (from the past 24 hour(s))   METABOLIC PANEL, COMPREHENSIVE    Collection Time: 02/01/23 10:29 AM   Result Value Ref Range    Sodium 131 (L) 136 - 145 mmol/L    Potassium 4.8 3.5 - 5.1 mmol/L    Chloride 103 97 - 108 mmol/L    CO2 21 21 - 32 mmol/L    Anion gap 7 5 - 15 mmol/L    Glucose 117 (H) 65 - 100 mg/dL    BUN 9 6 - 20 mg/dL    Creatinine 0.83 0.70 - 1.30 mg/dL    BUN/Creatinine ratio 11 (L) 12 - 20      eGFR >60 >60 ml/min/1.73m2    Calcium 9.7 8.5 - 10.1 mg/dL    Bilirubin, total 2.1 (H) 0.2 - 1.0 mg/dL    AST (SGOT) 44 (H) 15 - 37 U/L    ALT (SGPT) 30 12 - 78 U/L    Alk. phosphatase 82 45 - 117 U/L    Protein, total 9.4 (H) 6.4 - 8.2 g/dL    Albumin 3.8 3.5 - 5.0 g/dL    Globulin 5.6 (H) 2.0 - 4.0 g/dL    A-G Ratio 0.7 (L) 1.1 - 2.2     C REACTIVE PROTEIN, QT    Collection Time: 02/01/23 10:29 AM   Result Value Ref Range    C-Reactive protein 15.80 (H) 0.00 - 0.60 mg/dL   URINALYSIS W/ REFLEX CULTURE    Collection Time: 02/01/23 10:29 AM    Specimen: Urine   Result Value Ref Range    Color Yellow/Straw      Appearance Hazy (A) Clear      Specific gravity >1.030 (H) 1.003 - 1.030    pH (UA) 6.5      Protein 30 (A) Negative mg/dL    Glucose Negative Negative mg/dL    Ketone 40 (A) Negative mg/dL    Bilirubin Negative Negative      Blood Small (A) Negative      Urobilinogen 1.0 0.2 - 1.0 EU/dL    Nitrites Negative Negative      Leukocyte Esterase Negative Negative      UA:UC IF INDICATED Culture not indicated by UA result Culture not indicated by UA result      WBC 0-4 0 - 4 /hpf    RBC 0-5 0 - 5 /hpf    Epithelial cells Few Few /lpf    Bacteria Negative Negative /hpf   BILIRUBIN, DIRECT    Collection Time: 02/01/23 10:29 AM   Result Value Ref Range    Bilirubin, direct 0.1 0.0 - 0.2 mg/dL   CBC WITH AUTOMATED DIFF    Collection Time: 02/01/23 11:49 AM   Result Value Ref Range    WBC 15.6 (H) 4.1 - 11.1 K/uL    RBC 5.03 4. 10 - 5.70 M/uL    HGB 14.5 12.1 - 17.0 g/dL    HCT 42.4 36.6 - 50.3 %    MCV 84.3 80.0 - 99.0 FL    MCH 28.8 26.0 - 34.0 PG    MCHC 34.2 30.0 - 36.5 g/dL    RDW 12.8 11.5 - 14.5 %    PLATELET 494 202 - 556 K/uL    MPV 9.5 8.9 - 12.9 FL    NRBC 0.0 0.0  WBC    ABSOLUTE NRBC 0.00 0.00 - 0.01 K/uL    NEUTROPHILS 75 32 - 75 %    LYMPHOCYTES 13 12 - 49 %    MONOCYTES 11 5 - 13 %    EOSINOPHILS 0 0 - 7 %    BASOPHILS 0 0 - 1 %    IMMATURE GRANULOCYTES 1 (H) 0 - 0.5 %    ABS. NEUTROPHILS 11.7 (H) 1.8 - 8.0 K/UL    ABS. LYMPHOCYTES 2.0 0.8 - 3.5 K/UL    ABS. MONOCYTES 1.7 (H) 0.0 - 1.0 K/UL    ABS. EOSINOPHILS 0.0 0.0 - 0.4 K/UL    ABS. BASOPHILS 0.1 0.0 - 0.1 K/UL    ABS. IMM. GRANS. 0.1 (H) 0.00 - 0.04 K/UL    DF AUTOMATED     LACTIC ACID    Collection Time: 02/01/23 12:36 PM   Result Value Ref Range    Lactic acid 1.2 0.4 - 2.0 mmol/L   LACTIC ACID    Collection Time: 02/01/23  4:35 PM   Result Value Ref Range    Lactic acid 1.5 0.4 - 2.0 mmol/L           Imaging  CT A/P:  FINDINGS:   LOWER THORAX: No significant abnormality in the incidentally imaged lower chest.  LIVER: No mass. BILIARY TREE: Gallbladder is within normal limits. CBD is not dilated. SPLEEN: within normal limits. PANCREAS: No mass or ductal dilatation. ADRENALS: Unremarkable. KIDNEYS: No mass, calculus, or hydronephrosis. STOMACH: Unremarkable. SMALL BOWEL: No dilatation or wall thickening. COLON: No dilatation or wall thickening. There is a T-shaped abscess collection  posterior to the anus measuring 3.3 x 3.9 x 5.5 cm  APPENDIX: Not enlarged  PERITONEUM: No ascites or pneumoperitoneum. RETROPERITONEUM: No lymphadenopathy or aortic aneurysm. REPRODUCTIVE ORGANS: Not enlarged  URINARY BLADDER: No mass or calculus. BONES: No destructive bone lesion. ABDOMINAL WALL: No mass or hernia. ADDITIONAL COMMENTS: N/A     IMPRESSION     1. Perianal abscess    US Abd:  FINDINGS:  LIVER:   The liver is normal in echotexture with no mass or other focal abnormality. LIVER VASCULATURE:   The portal vein flow is patent with appropriate directional flow. Melanie Velasquez      GALLBLADDER:  The gallbladder contains a small amount of sludge. There is no wall thickening  or fluid around the gallbladder. COMMON BILE DUCT:  There is no biliary duct dilatation and the common duct measures 4 mm in  diameter. PANCREAS:  Not well seen due to overlying bowel gas. RIGHT KIDNEY:  The right kidney measures 11.8 cm in length. The right kidney demonstrates  normal echogenicity with no contour deforming mass. No hydronephrosis. IMPRESSION  1. Small amount of sludge in the gallbladder. No evidence of acute  cholecystitis. No evidence of biliary dilation. I have personally reviewed all of the pertinent images     Assessment:     Jane Heard is a 23 y.o. male with findings consistent with a perirectal abscess. He has incidental bilirubinemia, with no associated significant findings on CT or ultrasound. Recommendations:     1. Plan for incision and drainage of the abscess under anesthesia in the operating room. He has been started on broad-spectrum antibiotics. He will remain n.p.o. in the meantime. Will trend liver function. If bilirubin remains elevated, further work-up may be needed for this. 45 mins of time was spent with the patient of which > 50% of the time involved face-to-face counseling of the patient regarding the proposed treatment plan.     Mariza Mendoza MD

## 2023-02-01 NOTE — PROGRESS NOTES
Reason for Admission:  Perirectal Abscess                     RUR Score:   N/A                  Plan for utilizing home health:  None @ this time. PCP: First and Last name:  Per pt no PCP. Name of Practice:    Are you a current patient: Yes/No:    Approximate date of last visit:    Can you participate in a virtual visit with your PCP: Yes/Call/Has cell phone. Current Advanced Directive/Advance Care Plan: Full Code      Healthcare Decision Maker:   Pt declined. Transition of Care Plan:                    D/C Plan is home & a friend to transport. Send Rxs to [facility] on [address] upon discharge.

## 2023-02-01 NOTE — ED NOTES
TRANSFER - OUT REPORT:    Verbal report given to Jaguarjolanta Frank on Maria Del Carmen Bryant  being transferred to [facility] for routine progression of care       Report consisted of patients Situation, Background, Assessment and   Recommendations(SBAR). Information from the following report(s) SBAR, ED Summary, and MAR was reviewed with the receiving nurse. Lines:   Peripheral IV 02/01/23 Anterior;Proximal;Right Forearm (Active)        Opportunity for questions and clarification was provided.       Patient transported with:   Monitor

## 2023-02-01 NOTE — Clinical Note
Status[de-identified] INPATIENT [101]   Type of Bed: Surgical [18]   Inpatient Hospitalization Certified Necessary for the Following Reasons: 3.  Patient receiving treatment that can only be provided in an inpatient setting (further clarification in H&P documentation)   Admitting Diagnosis: Perirectal abscess [775327]   Admitting Physician: Jose Angel Muller   Attending Physician: Ynes Alvarado [[id number]]   Estimated Length of Stay: 2 Midnights   Discharge Plan[de-identified] Home with Office Follow-up

## 2023-02-01 NOTE — PROGRESS NOTES
Problem: Pain  Goal: *Control of Pain  2/1/2023 1842 by Lio Shin LPN  Outcome: Progressing Towards Goal  2/1/2023 1841 by Lio Shin LPN  Outcome: Progressing Towards Goal     Problem: Falls - Risk of  Goal: *Absence of Falls  Description: Document Monserrat Liana Fall Risk and appropriate interventions in the flowsheet.   2/1/2023 1842 by Lio Shin LPN  Outcome: Progressing Towards Goal  Note: Fall Risk Interventions:                             2/1/2023 1841 by Lio Shin LPN  Outcome: Progressing Towards Goal  Note: Fall Risk Interventions:

## 2023-02-01 NOTE — ANESTHESIA PREPROCEDURE EVALUATION
Relevant Problems   No relevant active problems       Anesthetic History   No history of anesthetic complications            Review of Systems / Medical History  Patient summary reviewed and pertinent labs reviewed    Pulmonary  Within defined limits                 Neuro/Psych   Within defined limits           Cardiovascular  Within defined limits                     GI/Hepatic/Renal  Within defined limits              Endo/Other  Within defined limits           Other Findings            History reviewed. No pertinent past medical history. History reviewed. No pertinent surgical history. Current Outpatient Medications   Medication Instructions    ibuprofen (ADVIL;MOTRIN) 10 mg/kg, Oral, EVERY 6 HOURS AS NEEDED       Current Facility-Administered Medications   Medication Dose Route Frequency    sodium chloride (NS) flush 5-10 mL  5-10 mL IntraVENous PRN    piperacillin-tazobactam (ZOSYN) 3.375 g in 0.9% sodium chloride (MBP/ADV) 100 mL MBP  3.375 g IntraVENous Q8H    sodium chloride (NS) flush 5-40 mL  5-40 mL IntraVENous Q8H    sodium chloride (NS) flush 5-40 mL  5-40 mL IntraVENous PRN    dextrose 5% lactated ringers infusion  125 mL/hr IntraVENous CONTINUOUS    ketorolac (TORADOL) injection 30 mg  30 mg IntraVENous Q6H PRN    morphine injection 4 mg  4 mg IntraVENous Q4H PRN    naloxone (NARCAN) injection 0.4 mg  0.4 mg IntraVENous PRN    ondansetron (ZOFRAN) injection 4 mg  4 mg IntraVENous Q4H PRN    enoxaparin (LOVENOX) injection 40 mg  40 mg SubCUTAneous Q24H    [START ON 2/2/2023] cefTRIAXone (ROCEPHIN) 2 g in sterile water (preservative free) 20 mL IV syringe  2 g IntraVENous Q24H    metroNIDAZOLE (FLAGYL) IVPB premix 500 mg  500 mg IntraVENous Q8H     Current Outpatient Medications   Medication Sig    ibuprofen (ADVIL;MOTRIN) 100 mg/5 mL suspension Take 31 mL by mouth every six (6) hours as needed.        Patient Vitals for the past 24 hrs:   Temp Pulse Resp BP SpO2   02/01/23 1457 36.6 °C (97.9 °F) 77 22 (!) 146/79 100 %   02/01/23 1323 37.6 °C (99.7 °F) 93 18 131/78 98 %   02/01/23 1259 -- -- -- (!) 141/103 99 %   02/01/23 0954 36.6 °C (97.8 °F) (!) 111 18 (!) 143/83 98 %       Lab Results   Component Value Date/Time    WBC 15.6 (H) 02/01/2023 11:49 AM    HGB 14.5 02/01/2023 11:49 AM    HCT 42.4 02/01/2023 11:49 AM    PLATELET 913 33/48/0945 11:49 AM    MCV 84.3 02/01/2023 11:49 AM     Lab Results   Component Value Date/Time    Sodium 131 (L) 02/01/2023 10:29 AM    Potassium 4.8 02/01/2023 10:29 AM    Chloride 103 02/01/2023 10:29 AM    CO2 21 02/01/2023 10:29 AM    Anion gap 7 02/01/2023 10:29 AM    Glucose 117 (H) 02/01/2023 10:29 AM    BUN 9 02/01/2023 10:29 AM    Creatinine 0.83 02/01/2023 10:29 AM    BUN/Creatinine ratio 11 (L) 02/01/2023 10:29 AM    Calcium 9.7 02/01/2023 10:29 AM     No results found for: APTT, PTP, INR, INREXT  Lab Results   Component Value Date/Time    Glucose 117 (H) 02/01/2023 10:29 AM         Physical Exam    Airway  Mallampati: I  TM Distance: 4 - 6 cm  Neck ROM: normal range of motion   Mouth opening: Normal     Cardiovascular    Rhythm: regular  Rate: normal         Dental  No notable dental hx       Pulmonary  Breath sounds clear to auscultation               Abdominal  GI exam deferred       Other Findings            Anesthetic Plan    ASA: 1, emergent  Anesthesia type: general    Monitoring Plan: Continuous noninvasive hemodynamic monitoring      Induction: Intravenous  Anesthetic plan and risks discussed with: Patient

## 2023-02-02 LAB
ALBUMIN SERPL-MCNC: 2.8 G/DL (ref 3.5–5)
ALBUMIN/GLOB SERPL: 0.8 (ref 1.1–2.2)
ALP SERPL-CCNC: 61 U/L (ref 45–117)
ALT SERPL-CCNC: 18 U/L (ref 12–78)
ANION GAP SERPL CALC-SCNC: 7 MMOL/L (ref 5–15)
AST SERPL W P-5'-P-CCNC: 14 U/L (ref 15–37)
BASOPHILS # BLD: 0 K/UL (ref 0–0.1)
BASOPHILS NFR BLD: 0 % (ref 0–1)
BILIRUB SERPL-MCNC: 1.3 MG/DL (ref 0.2–1)
BUN SERPL-MCNC: 7 MG/DL (ref 6–20)
BUN/CREAT SERPL: 11 (ref 12–20)
CA-I BLD-MCNC: 8.2 MG/DL (ref 8.5–10.1)
CHLORIDE SERPL-SCNC: 108 MMOL/L (ref 97–108)
CO2 SERPL-SCNC: 23 MMOL/L (ref 21–32)
CREAT SERPL-MCNC: 0.63 MG/DL (ref 0.7–1.3)
DIFFERENTIAL METHOD BLD: ABNORMAL
EOSINOPHIL # BLD: 0.1 K/UL (ref 0–0.4)
EOSINOPHIL NFR BLD: 1 % (ref 0–7)
ERYTHROCYTE [DISTWIDTH] IN BLOOD BY AUTOMATED COUNT: 12.9 % (ref 11.5–14.5)
GLOBULIN SER CALC-MCNC: 3.3 G/DL (ref 2–4)
GLUCOSE SERPL-MCNC: 99 MG/DL (ref 65–100)
HCT VFR BLD AUTO: 37.3 % (ref 36.6–50.3)
HGB BLD-MCNC: 12.6 G/DL (ref 12.1–17)
IMM GRANULOCYTES # BLD AUTO: 0.1 K/UL (ref 0–0.04)
IMM GRANULOCYTES NFR BLD AUTO: 1 % (ref 0–0.5)
LYMPHOCYTES # BLD: 2 K/UL (ref 0.8–3.5)
LYMPHOCYTES NFR BLD: 13 % (ref 12–49)
MCH RBC QN AUTO: 28.8 PG (ref 26–34)
MCHC RBC AUTO-ENTMCNC: 33.8 G/DL (ref 30–36.5)
MCV RBC AUTO: 85.2 FL (ref 80–99)
MONOCYTES # BLD: 1.6 K/UL (ref 0–1)
MONOCYTES NFR BLD: 11 % (ref 5–13)
NEUTS SEG # BLD: 10.8 K/UL (ref 1.8–8)
NEUTS SEG NFR BLD: 74 % (ref 32–75)
NRBC # BLD: 0 K/UL (ref 0–0.01)
NRBC BLD-RTO: 0 PER 100 WBC
PLATELET # BLD AUTO: 295 K/UL (ref 150–400)
PMV BLD AUTO: 10.6 FL (ref 8.9–12.9)
POTASSIUM SERPL-SCNC: 3.6 MMOL/L (ref 3.5–5.1)
PROT SERPL-MCNC: 6.1 G/DL (ref 6.4–8.2)
RBC # BLD AUTO: 4.38 M/UL (ref 4.1–5.7)
SODIUM SERPL-SCNC: 138 MMOL/L (ref 136–145)
WBC # BLD AUTO: 14.6 K/UL (ref 4.1–11.1)

## 2023-02-02 PROCEDURE — 80053 COMPREHEN METABOLIC PANEL: CPT

## 2023-02-02 PROCEDURE — 36415 COLL VENOUS BLD VENIPUNCTURE: CPT

## 2023-02-02 PROCEDURE — 65270000029 HC RM PRIVATE

## 2023-02-02 PROCEDURE — 74011000250 HC RX REV CODE- 250: Performed by: SURGERY

## 2023-02-02 PROCEDURE — 74011250636 HC RX REV CODE- 250/636: Performed by: SURGERY

## 2023-02-02 PROCEDURE — 74011250637 HC RX REV CODE- 250/637: Performed by: SURGERY

## 2023-02-02 PROCEDURE — 85025 COMPLETE CBC W/AUTO DIFF WBC: CPT

## 2023-02-02 RX ORDER — DOCUSATE SODIUM 100 MG/1
100 CAPSULE, LIQUID FILLED ORAL DAILY
Status: DISCONTINUED | OUTPATIENT
Start: 2023-02-03 | End: 2023-02-03 | Stop reason: HOSPADM

## 2023-02-02 RX ORDER — POLYETHYLENE GLYCOL 3350 17 G/17G
17 POWDER, FOR SOLUTION ORAL DAILY PRN
Status: DISCONTINUED | OUTPATIENT
Start: 2023-02-02 | End: 2023-02-03 | Stop reason: HOSPADM

## 2023-02-02 RX ADMIN — METRONIDAZOLE 500 MG: 500 INJECTION, SOLUTION INTRAVENOUS at 12:32

## 2023-02-02 RX ADMIN — SODIUM CHLORIDE, PRESERVATIVE FREE 10 ML: 5 INJECTION INTRAVENOUS at 20:05

## 2023-02-02 RX ADMIN — METRONIDAZOLE 500 MG: 500 INJECTION, SOLUTION INTRAVENOUS at 20:04

## 2023-02-02 RX ADMIN — MORPHINE SULFATE 4 MG: 4 INJECTION, SOLUTION INTRAMUSCULAR; INTRAVENOUS at 00:49

## 2023-02-02 RX ADMIN — MORPHINE SULFATE 4 MG: 4 INJECTION, SOLUTION INTRAMUSCULAR; INTRAVENOUS at 18:07

## 2023-02-02 RX ADMIN — OXYCODONE HYDROCHLORIDE 10 MG: 10 TABLET ORAL at 20:04

## 2023-02-02 RX ADMIN — SODIUM CHLORIDE, PRESERVATIVE FREE 10 ML: 5 INJECTION INTRAVENOUS at 04:23

## 2023-02-02 RX ADMIN — SODIUM CHLORIDE, PRESERVATIVE FREE 10 ML: 5 INJECTION INTRAVENOUS at 06:05

## 2023-02-02 RX ADMIN — MORPHINE SULFATE 4 MG: 4 INJECTION, SOLUTION INTRAMUSCULAR; INTRAVENOUS at 12:32

## 2023-02-02 RX ADMIN — MORPHINE SULFATE 4 MG: 4 INJECTION, SOLUTION INTRAMUSCULAR; INTRAVENOUS at 23:30

## 2023-02-02 RX ADMIN — KETOROLAC TROMETHAMINE 30 MG: 30 INJECTION, SOLUTION INTRAMUSCULAR; INTRAVENOUS at 04:29

## 2023-02-02 RX ADMIN — METRONIDAZOLE 500 MG: 500 INJECTION, SOLUTION INTRAVENOUS at 04:22

## 2023-02-02 RX ADMIN — CEFTRIAXONE 2 G: 2 INJECTION, POWDER, FOR SOLUTION INTRAMUSCULAR; INTRAVENOUS at 09:25

## 2023-02-02 RX ADMIN — SODIUM CHLORIDE, PRESERVATIVE FREE 10 ML: 5 INJECTION INTRAVENOUS at 20:06

## 2023-02-02 RX ADMIN — OXYCODONE HYDROCHLORIDE 10 MG: 10 TABLET ORAL at 09:26

## 2023-02-02 RX ADMIN — OXYCODONE HYDROCHLORIDE 10 MG: 10 TABLET ORAL at 14:44

## 2023-02-02 NOTE — ROUTINE PROCESS
TRANSFER - OUT REPORT:    Verbal report given to Marni(name) on Brianna Worley  being transferred to (unit) for routine post - op       Report consisted of patients Situation, Background, Assessment and   Recommendations(SBAR). Information from the following report(s) SBAR, OR Summary, Procedure Summary, Intake/Output, and Dual Neuro Assessment was reviewed with the receiving nurse. Opportunity for questions and clarification was provided.       Patient transported with:   O2 @ 2 liters  Registered Nurse

## 2023-02-02 NOTE — PROGRESS NOTES
0740: Chart reviewed. Per notes, patient had I&D of perirectal abscess last night and on double IV ABX (Rocephin and Flagyl). Current Dispo: Home, no needs.     CM will continue to follow patient and recs of medical team.

## 2023-02-02 NOTE — PROGRESS NOTES
Problem: Pain  Goal: *Control of Pain  Outcome: Progressing Towards Goal     Problem: Pain  Goal: *Control of Pain  Outcome: Progressing Towards Goal     Problem: Falls - Risk of  Goal: *Absence of Falls  Description: Document Carlitos Fall Risk and appropriate interventions in the flowsheet.   Outcome: Progressing Towards Goal  Note: Fall Risk Interventions:            Medication Interventions: Patient to call before getting OOB                   Problem: Patient Education: Go to Patient Education Activity  Goal: Patient/Family Education  Outcome: Progressing Towards Goal

## 2023-02-02 NOTE — PROGRESS NOTES
Bedside shift change report given to Sheree (oncoming nurse) by Dimitris Guthrie (offgoing nurse). Report included the following information SBAR, OR Summary, Intake/Output, MAR, Recent Results, and Quality Measures.

## 2023-02-02 NOTE — ANESTHESIA POSTPROCEDURE EVALUATION
Procedure(s):  INCISION AND DRAINAGE OF PERIRECTAL ABSCESS. general    Anesthesia Post Evaluation      Multimodal analgesia: multimodal analgesia used between 6 hours prior to anesthesia start to PACU discharge  Patient location during evaluation: PACU  Patient participation: complete - patient participated  Level of consciousness: awake  Pain score: 0  Pain management: adequate  Airway patency: patent  Anesthetic complications: no  Cardiovascular status: acceptable  Respiratory status: acceptable  Hydration status: acceptable  Post anesthesia nausea and vomiting:  controlled  Final Post Anesthesia Temperature Assessment:  Normothermia (36.0-37.5 degrees C)      INITIAL Post-op Vital signs:   Vitals Value Taken Time   BP 95/33 02/01/23 2250   Temp 37.4 °C (99.3 °F) 02/01/23 2250   Pulse 77 02/01/23 2250   Resp 16 02/01/23 2250   SpO2 98 % 02/01/23 2250   Vitals shown include unvalidated device data.

## 2023-02-02 NOTE — OP NOTES
Operative Report    Jane Heard    MRN:  [id number]    Date of Procedure:   2/1/2023     Surgeon:  Mariza Mendoza MD.      Assistant:    none. Anesthesia:   General.     Preoperative Diagnosis:  PERIRECTAL ABSCESS. Postoperative Diagnosis:  PERIRECTAL ABSCESS. Procedure(s):  INCISION AND DRAINAGE OF PERIRECTAL ABSCESS. Indication: This is a 28-year-old male who presented to the emergency department with perirectal pain for several days. He was found to have a posterior perirectal abscess. He was admitted and placed on broad-spectrum antibiotics. I consented him for incision and drainage of the perirectal abscess under anesthesia. We discussed the risks including bleeding, infection and anal stricture or incontinence. He understood and is willing to proceed    Findings: There was a abscess posterior to the anus, which appeared to be intersphincteric. There was no obvious internal rectal fistula to the abscess. Procedure in Detail:  The patient was seen in the holding area. The risks, benefits, and expected outcome were discussed with the patient, and all questions were answered satisfactorily. The patient concurred with the proposed plan, giving informed consent. The patient was taken to the operating room. The patient was placed on the OR table in the lithotomy position. Prior to induction of anesthesia, antibiotic prophylaxis was administered. General anesthesia was administered and tolerated well. The patient's perineum was prepped and draped in the usual sterile fashion. A Time Out was performed. After infiltration of local anesthetic to perform a perineal nerve block, a skin incision was made over the area of the abscess. Care was taken to not injure the underlying sphincter muscles. There was an immediate rush of purulent fluid, which was swabbed for culture. The abscess cavity was probed. Rectal exam revealed the findings as above.   The abscess cavity was thoroughly irrigated with warm saline. The wound was packed with iodoform gauze, and dressed with ABD pad and mesh underwear. The patient was taken to the recovery area in a stable condition. All sponge and instrument counts were correct. Estimated Blood Loss:  less than 50 ml      Specimen:   ID Type Source Tests Collected by Time Destination   1 : Perirectal Wound Culture Wound Perianal CULTURE, ANAEROBIC, CULTURE, WOUND W Le Acosta MD 2/1/2023 [id number] Microbiology                Complications:   None.                    Signed By: Yi Hubbard MD     February 1, 2023        CC:  None

## 2023-02-02 NOTE — PROGRESS NOTES
[facility]    POD #1    Subjective     No acute events  Some pain, but overall controlled  No BM yet, passing flatus    Objective     Patient Vitals for the past 24 hrs:   Temp Pulse Resp BP SpO2   02/02/23 0803 98 °F (36.7 °C) (!) 54 14 112/63 100 %   02/02/23 0412 99.3 °F (37.4 °C) 74 18 110/63 98 %   02/02/23 0157 99.1 °F (37.3 °C) 80 16 (!) 101/54 97 %   02/02/23 0050 98.9 °F (37.2 °C) 70 18 112/72 100 %   02/02/23 0000 98.9 °F (37.2 °C) 68 20 116/69 99 %   02/01/23 2345 99 °F (37.2 °C) 74 16 110/72 98 %   02/01/23 2325 99.2 °F (37.3 °C) 74 18 108/67 99 %   02/01/23 2305 99.8 °F (37.7 °C) 74 15 (!) 102/47 100 %   02/01/23 2300 -- 76 16 (!) 101/40 99 %   02/01/23 2255 -- 77 16 (!) 97/36 99 %   02/01/23 2250 99.3 °F (37.4 °C) 78 17 (!) 95/33 99 %   02/01/23 2245 99.2 °F (37.3 °C) 79 18 (!) 91/33 93 %   02/01/23 2114 99.9 °F (37.7 °C) (!) 36 16 127/83 99 %   02/01/23 2008 99.3 °F (37.4 °C) -- -- -- 99 %   02/2002 100.1 °F (37.8 °C) 78 -- (!) 145/78 (!) 78 %   02/01/23 1651 -- -- -- -- 100 %   02/01/23 1649 -- -- -- -- 100 %   02/01/23 1608 98 °F (36.7 °C) 67 18 (!) 140/88 100 %   02/01/23 1457 97.9 °F (36.6 °C) 77 22 (!) 146/79 100 %   02/01/23 1323 99.7 °F (37.6 °C) 93 18 131/78 98 %       Date 02/01/23 0700 - 02/02/23 0659 02/02/23 0700 - 02/03/23 0659   Shift 7013-3580 6007-2827 24 Hour Total 7340-4855 2942-8238 24 Hour Total   INTAKE   I.V.(mL/kg/hr)  950(0.9) 950(0.4)        Volume (lactated Ringers infusion)  850 850        Volume (piperacillin-tazobactam (ZOSYN) 3.375 g in 0.9% sodium chloride (MBP/ADV) 100 mL MBP)  100 100      Shift Total(mL/kg)  950(10.7) 950(10.7)      OUTPUT   Blood  7 7        Estimated Blood Loss  7 7      Shift Total(mL/kg)  7(0.1) 7(0.1)      NET  943 943      Weight (kg) 88.5 88.5 88.5 88.5 88.5 88.5       PE  GEN - Awake, alert, communicating appropriately.   NAD  Pulm - CTAB  CV - RRR  Posterior anal incision open and packed, minimal serosanguinous drainage, surrounding erythema/induration decreased    Labs  Recent Results (from the past 24 hour(s))   LACTIC ACID    Collection Time: 02/01/23  4:35 PM   Result Value Ref Range    Lactic acid 1.5 0.4 - 2.0 mmol/L   CBC WITH AUTOMATED DIFF    Collection Time: 02/02/23  3:12 AM   Result Value Ref Range    WBC 14.6 (H) 4.1 - 11.1 K/uL    RBC 4.38 4.10 - 5.70 M/uL    HGB 12.6 12.1 - 17.0 g/dL    HCT 37.3 36.6 - 50.3 %    MCV 85.2 80.0 - 99.0 FL    MCH 28.8 26.0 - 34.0 PG    MCHC 33.8 30.0 - 36.5 g/dL    RDW 12.9 11.5 - 14.5 %    PLATELET 878 343 - 182 K/uL    MPV 10.6 8.9 - 12.9 FL    NRBC 0.0 0.0  WBC    ABSOLUTE NRBC 0.00 0.00 - 0.01 K/uL    NEUTROPHILS 74 32 - 75 %    LYMPHOCYTES 13 12 - 49 %    MONOCYTES 11 5 - 13 %    EOSINOPHILS 1 0 - 7 %    BASOPHILS 0 0 - 1 %    IMMATURE GRANULOCYTES 1 (H) 0 - 0.5 %    ABS. NEUTROPHILS 10.8 (H) 1.8 - 8.0 K/UL    ABS. LYMPHOCYTES 2.0 0.8 - 3.5 K/UL    ABS. MONOCYTES 1.6 (H) 0.0 - 1.0 K/UL    ABS. EOSINOPHILS 0.1 0.0 - 0.4 K/UL    ABS. BASOPHILS 0.0 0.0 - 0.1 K/UL    ABS. IMM. GRANS. 0.1 (H) 0.00 - 0.04 K/UL    DF AUTOMATED     METABOLIC PANEL, COMPREHENSIVE    Collection Time: 02/02/23  3:12 AM   Result Value Ref Range    Sodium 138 136 - 145 mmol/L    Potassium 3.6 3.5 - 5.1 mmol/L    Chloride 108 97 - 108 mmol/L    CO2 23 21 - 32 mmol/L    Anion gap 7 5 - 15 mmol/L    Glucose 99 65 - 100 mg/dL    BUN 7 6 - 20 mg/dL    Creatinine 0.63 (L) 0.70 - 1.30 mg/dL    BUN/Creatinine ratio 11 (L) 12 - 20      eGFR >60 >60 ml/min/1.73m2    Calcium 8.2 (L) 8.5 - 10.1 mg/dL    Bilirubin, total 1.3 (H) 0.2 - 1.0 mg/dL    AST (SGOT) 14 (L) 15 - 37 U/L    ALT (SGPT) 18 12 - 78 U/L    Alk. phosphatase 61 45 - 117 U/L    Protein, total 6.1 (L) 6.4 - 8.2 g/dL    Albumin 2.8 (L) 3.5 - 5.0 g/dL    Globulin 3.3 2.0 - 4.0 g/dL    A-G Ratio 0.8 (L) 1.1 - 2.2           Assessment     Fermin Yun is a 23 y. o.yr old male POD 1 I and D perirectal abscess  Doing well  Infection improved post drainage  constipation    Plan     Will change packing tomorrow, reinforce for now  Bowel regimen  Ambulate  Continue IV antibiotics for now    20 mins of time was spent with the patient of which > 50% of the time involved face-to-face counseling of the patient regarding the proposed treatment plan.     Nereyda Salas MD

## 2023-02-02 NOTE — PROGRESS NOTES
Received pt from PACU. Pt is resting comfortably. Has some serosanguinous drainage from I&D site. ABD, mesh underwear intact.

## 2023-02-03 VITALS
SYSTOLIC BLOOD PRESSURE: 131 MMHG | HEIGHT: 70 IN | RESPIRATION RATE: 20 BRPM | DIASTOLIC BLOOD PRESSURE: 81 MMHG | HEART RATE: 49 BPM | BODY MASS INDEX: 27.92 KG/M2 | WEIGHT: 195 LBS | TEMPERATURE: 98.2 F | OXYGEN SATURATION: 100 %

## 2023-02-03 LAB
ALBUMIN SERPL-MCNC: 2.9 G/DL (ref 3.5–5)
ALBUMIN/GLOB SERPL: 0.8 (ref 1.1–2.2)
ALP SERPL-CCNC: 66 U/L (ref 45–117)
ALT SERPL-CCNC: 19 U/L (ref 12–78)
ANION GAP SERPL CALC-SCNC: 8 MMOL/L (ref 5–15)
AST SERPL W P-5'-P-CCNC: 14 U/L (ref 15–37)
BASOPHILS # BLD: 0.1 K/UL (ref 0–0.1)
BASOPHILS NFR BLD: 0 % (ref 0–1)
BILIRUB SERPL-MCNC: 0.9 MG/DL (ref 0.2–1)
BUN SERPL-MCNC: 8 MG/DL (ref 6–20)
BUN/CREAT SERPL: 13 (ref 12–20)
CA-I BLD-MCNC: 8.7 MG/DL (ref 8.5–10.1)
CHLORIDE SERPL-SCNC: 107 MMOL/L (ref 97–108)
CO2 SERPL-SCNC: 24 MMOL/L (ref 21–32)
CREAT SERPL-MCNC: 0.6 MG/DL (ref 0.7–1.3)
DIFFERENTIAL METHOD BLD: ABNORMAL
EOSINOPHIL # BLD: 0.2 K/UL (ref 0–0.4)
EOSINOPHIL NFR BLD: 2 % (ref 0–7)
ERYTHROCYTE [DISTWIDTH] IN BLOOD BY AUTOMATED COUNT: 12.6 % (ref 11.5–14.5)
GLOBULIN SER CALC-MCNC: 3.5 G/DL (ref 2–4)
GLUCOSE SERPL-MCNC: 79 MG/DL (ref 65–100)
HCT VFR BLD AUTO: 38.1 % (ref 36.6–50.3)
HGB BLD-MCNC: 12.9 G/DL (ref 12.1–17)
IMM GRANULOCYTES # BLD AUTO: 0.2 K/UL (ref 0–0.04)
IMM GRANULOCYTES NFR BLD AUTO: 1 % (ref 0–0.5)
LYMPHOCYTES # BLD: 2.4 K/UL (ref 0.8–3.5)
LYMPHOCYTES NFR BLD: 20 % (ref 12–49)
MCH RBC QN AUTO: 28.9 PG (ref 26–34)
MCHC RBC AUTO-ENTMCNC: 33.9 G/DL (ref 30–36.5)
MCV RBC AUTO: 85.2 FL (ref 80–99)
MONOCYTES # BLD: 1.3 K/UL (ref 0–1)
MONOCYTES NFR BLD: 11 % (ref 5–13)
NEUTS SEG # BLD: 7.7 K/UL (ref 1.8–8)
NEUTS SEG NFR BLD: 66 % (ref 32–75)
NRBC # BLD: 0 K/UL (ref 0–0.01)
NRBC BLD-RTO: 0 PER 100 WBC
PLATELET # BLD AUTO: 309 K/UL (ref 150–400)
PMV BLD AUTO: 10.6 FL (ref 8.9–12.9)
POTASSIUM SERPL-SCNC: 3.6 MMOL/L (ref 3.5–5.1)
PROT SERPL-MCNC: 6.4 G/DL (ref 6.4–8.2)
RBC # BLD AUTO: 4.47 M/UL (ref 4.1–5.7)
SODIUM SERPL-SCNC: 139 MMOL/L (ref 136–145)
WBC # BLD AUTO: 11.8 K/UL (ref 4.1–11.1)

## 2023-02-03 PROCEDURE — 36415 COLL VENOUS BLD VENIPUNCTURE: CPT

## 2023-02-03 PROCEDURE — 74011250637 HC RX REV CODE- 250/637: Performed by: SURGERY

## 2023-02-03 PROCEDURE — 74011000250 HC RX REV CODE- 250: Performed by: SURGERY

## 2023-02-03 PROCEDURE — 85025 COMPLETE CBC W/AUTO DIFF WBC: CPT

## 2023-02-03 PROCEDURE — 74011250636 HC RX REV CODE- 250/636: Performed by: SURGERY

## 2023-02-03 PROCEDURE — 80053 COMPREHEN METABOLIC PANEL: CPT

## 2023-02-03 RX ORDER — AMOXICILLIN AND CLAVULANATE POTASSIUM 875; 125 MG/1; MG/1
1 TABLET, FILM COATED ORAL 2 TIMES DAILY
Qty: 12 TABLET | Refills: 0 | Status: SHIPPED | OUTPATIENT
Start: 2023-02-03 | End: 2023-02-09

## 2023-02-03 RX ORDER — HYDROCODONE BITARTRATE AND ACETAMINOPHEN 5; 325 MG/1; MG/1
1-2 TABLET ORAL
Qty: 30 TABLET | Refills: 0 | Status: SHIPPED | OUTPATIENT
Start: 2023-02-03 | End: 2023-02-10

## 2023-02-03 RX ADMIN — MORPHINE SULFATE 4 MG: 4 INJECTION, SOLUTION INTRAMUSCULAR; INTRAVENOUS at 11:37

## 2023-02-03 RX ADMIN — CEFTRIAXONE 2 G: 2 INJECTION, POWDER, FOR SOLUTION INTRAMUSCULAR; INTRAVENOUS at 08:19

## 2023-02-03 RX ADMIN — MORPHINE SULFATE 4 MG: 4 INJECTION, SOLUTION INTRAMUSCULAR; INTRAVENOUS at 06:55

## 2023-02-03 RX ADMIN — METRONIDAZOLE 500 MG: 500 INJECTION, SOLUTION INTRAVENOUS at 04:45

## 2023-02-03 RX ADMIN — SODIUM CHLORIDE, PRESERVATIVE FREE 10 ML: 5 INJECTION INTRAVENOUS at 04:46

## 2023-02-03 RX ADMIN — OXYCODONE HYDROCHLORIDE 10 MG: 10 TABLET ORAL at 02:51

## 2023-02-03 RX ADMIN — DOCUSATE SODIUM 100 MG: 100 CAPSULE, LIQUID FILLED ORAL at 08:19

## 2023-02-03 NOTE — DISCHARGE INSTRUCTIONS
Patient Discharge Instructions    Lisa Hernandez / [id number] : 2000    Admitted 2023 Discharged: 2/3/2023       Drainage Rectal Abscess      FOLLOW-UP:  Please make an appointment with your physician in 10 - 14 day(s). Call your physician immediately if you have any fevers greater than 101.5, drainage from your wound that is not clear or looks infected, persistent bleeding, increasing abdominal pain, problems urinating, or persistent nausea/vomiting. WOUND CARE INSTRUCTIONS:   You may shower at home. Leave the packing in place for 24-48 hours. After that, remove the packing, and continue to cover the wound with gauze pad as needed. You should do a sitz bath daily and after any bowel movements. Try to keep the wound dry and avoid ointments on the wound unless directed to do so. If the wound becomes bright red and painful or starts to drain infected material that is not clear, please contact your physician immediately. DIET:  You may eat any foods that you can tolerate. It is a good idea to eat a high fiber diet and take in plenty of fluids to prevent constipation. If you do become constipated you may should  take a mild laxative or take ducolax tablets on a daily basis until your bowel habits are regular. Constipation can be very uncomfortable, along with straining, after recent abdominal surgery. ACTIVITY:  You are encouraged to cough and deep breath or use your incentive spirometer if you were given one, every 15-30 minutes when awake. This will help prevent respiratory complications and low grade fevers post-operatively. You are encouraged to walk and engage in light activity for the next two weeks. Most people are able to return to work within 1 to 2 weeks after surgery. You may shower 24 hours after surgery. Pat the cut (incision) dry. Do not take a bath or swim for the first two weeks. Your doctor will tell you when you can have sex again.     MEDICATIONS:  Try to take narcotic medications and anti-inflammatory medications, such as tylenol, ibuprofen, naprosyn, etc., with food. This will minimize stomach upset from the medication. You should take a stool softener, such as Colace or Senakot, while taking narcotic pain medications to help prevent constipation. Should you develop nausea and vomiting from the pain medication, or develop a rash, please discontinue the medication and contact your physician. You should not drive, make important decisions, or operate machinery when taking narcotic pain medication. It is important that you take the medication exactly as they are prescribed. Keep your medication in the bottles provided by the pharmacist and keep a list of the medication names, dosages, and times to be taken in your wallet. Do not take other medications without consulting your doctor. Take prescribed medications as needed for pain, or over the counter medications as needed for  pain. Be sure to limit the amount of acetaminophen (Tylenol) from any source. QUESTIONS:  Please feel free to call Dr. Mago Limon office ([phone]) if you have any questions, and they will be glad to assist you. Follow-up with Dr. Fabiola Ortiz in ~ 10 days. Call the office to schedule your appointment. Information obtained by :    I understand that if any problems occur once I am at home I am to contact my physician. I understand and acknowledge receipt of the instructions indicated above.                                                                                                                                            Physician's or R.N.'s Signature                                                                  Date/Time                                                                                                                                              Patient or Representative Signature                                                          Date/Time

## 2023-02-03 NOTE — PROGRESS NOTES
Bedside shift change report given to [employer]   (oncoming nurse) by Jeri Odell   (offgoing nurse). Report included the following information SBAR, Intake/Output, MAR, Recent Results, and Quality Measures.

## 2023-02-03 NOTE — PROGRESS NOTES
Problem: Pain  Goal: *Control of Pain  Outcome: Progressing Towards Goal     Problem: Patient Education: Go to Patient Education Activity  Goal: Patient/Family Education  Outcome: Progressing Towards Goal     Problem: Discharge Planning  Goal: *Discharge to safe environment  Outcome: Progressing Towards Goal

## 2023-02-03 NOTE — DISCHARGE SUMMARY
Physician Discharge Summary     Patient ID:  Olimpia Mensah  [id number]  9 y.o.  2000    Admit Date: 2/1/2023    Discharge Date:     Admission Diagnoses: Perirectal abscess [K61.1]    Discharge Diagnoses: Active Problems:    Perirectal abscess (2/1/2023)         Admission Condition: Fair    Discharge Condition: Good    Procedure(s):    2/1/2023 - Procedure(s):  INCISION AND DRAINAGE OF [facility] Course:   Normal hospital course for this procedure. He was discharged on a course of oral antibiotics. Wound care instructions were given. Consults: None      Disposition: home    Patient Instructions:   Current Discharge Medication List        START taking these medications    Details   HYDROcodone-acetaminophen (NORCO) 5-325 mg per tablet Take 1-2 Tablets by mouth every six (6) hours as needed for Pain for up to 7 days. Max Daily Amount: 8 Tablets. Qty: 30 Tablet, Refills: 0    Associated Diagnoses: Post-operative pain      amoxicillin-clavulanate (AUGMENTIN) 875-125 mg per tablet Take 1 Tablet by mouth two (2) times a day for 6 days. Qty: 12 Tablet, Refills: 0           CONTINUE these medications which have NOT CHANGED    Details   ibuprofen (ADVIL;MOTRIN) 100 mg/5 mL suspension Take 31 mL by mouth every six (6) hours as needed. Qty: 1 Bottle, Refills: 0             Activity: Activity as tolerated  Diet: Regular Diet  Wound Care: As directed    Follow-up with Armando Amin MD in ~10 days  Follow-up tests/labs none needed    25 mins of time was spent with the patient of which > 50% of the time involved face-to-face counseling of the patient regarding the proposed treatment plan.   Signed:  Armando Amin MD  2/3/2023  12:06 PM

## 2023-02-04 LAB
BACTERIA SPEC CULT: NORMAL
GRAM STN SPEC: NORMAL
GRAM STN SPEC: NORMAL
SPECIAL REQUESTS,SREQ: NORMAL

## 2023-02-05 LAB
BACTERIA SPEC CULT: NORMAL
SPECIAL REQUESTS,SREQ: NORMAL

## 2023-02-06 ENCOUNTER — TELEPHONE (OUTPATIENT)
Dept: SURGERY | Age: 23
End: 2023-02-06

## 2023-02-06 NOTE — TELEPHONE ENCOUNTER
[employer] called this afternoon and asked if Dr Jesusita Lerma could write him a school note since he was in the hospital with the patient.  Please call [phone]

## 2023-02-08 LAB
BACTERIA SPEC CULT: NORMAL
SPECIAL REQUESTS,SREQ: NORMAL

## 2023-02-15 ENCOUNTER — OFFICE VISIT (OUTPATIENT)
Dept: SURGERY | Age: 23
End: 2023-02-15
Payer: MEDICAID

## 2023-02-15 VITALS
WEIGHT: 190.6 LBS | HEART RATE: 84 BPM | TEMPERATURE: 97 F | SYSTOLIC BLOOD PRESSURE: 130 MMHG | DIASTOLIC BLOOD PRESSURE: 111 MMHG | OXYGEN SATURATION: 98 % | BODY MASS INDEX: 29.91 KG/M2 | HEIGHT: 67 IN

## 2023-02-15 DIAGNOSIS — K61.1 PERIRECTAL ABSCESS: ICD-10-CM

## 2023-02-15 DIAGNOSIS — Z48.89 POSTOPERATIVE VISIT: Primary | ICD-10-CM

## 2023-02-15 PROCEDURE — 99024 POSTOP FOLLOW-UP VISIT: CPT | Performed by: SURGERY

## 2023-02-15 NOTE — PROGRESS NOTES
[facility]      Clinic Note - Follow up    Subjective     Baljeet Gaspar returns for scheduled follow up today. He is post incision and drainage of a perirectal abscess. He has been doing well since discharge. He is no longer packing the wound. He is continuing to do sitz baths. He denies fever or chills. He denies constipation. Objective     Visit Vitals  BP (!) 130/111 (BP 1 Location: Right upper arm, BP Patient Position: Sitting, BP Cuff Size: Large adult)   Pulse 84   Temp 97 °F (36.1 °C) (Temporal)   Ht 5' 7\" (1.702 m)   Wt 190 lb 9.6 oz (86.5 kg)   SpO2 98%   BMI 29.85 kg/m²         PE  GEN - Awake, alert, communicating appropriately. NAD  Pulm - CTAB  CV - RRR  Perirectal incision site nearly healed, no induration, fluctuance or erythema  All other systems negative unless indicated above. Assessment     Baljeet Gaspar is a 23 y. o.yr old male who is post incision and drainage of a perirectal abscess. The abscess and infection appear to be resolved, and the incision site is healing well. Plan     I recommended that he continue sitz baths for the next 2 weeks, until the wound completely heals. Return precautions were given. He may follow-up with me on an as-needed basis. 15 mins of time was spent with the patient of which > 50% of the time involved face-to-face counseling of the patient regarding the proposed treatment plan.       Robin Reid MD  2/15/2023

## 2023-02-15 NOTE — PROGRESS NOTES
Identified pt with two pt identifiers (name and ). Reviewed chart in preparation for visit and have obtained necessary documentation. Mariangel Rea is a 23 y.o. male  Chief Complaint   Patient presents with    New Patient     sp abscess hosp follow up,     Visit Vitals  BP (!) 130/111 (BP 1 Location: Right upper arm, BP Patient Position: Sitting, BP Cuff Size: Large adult)   Pulse 84   Temp 97 °F (36.1 °C) (Temporal)   Ht 5' 7\" (1.702 m)   Wt 190 lb 9.6 oz (86.5 kg)   SpO2 98%   BMI 29.85 kg/m²       1. Have you been to the ER, urgent care clinic since your last visit? Hospitalized since your last visit? No    2. Have you seen or consulted any other health care providers outside of the [address] [place] [address] since your last visit? Include any pap smears or colon screening.  No

## (undated) DEVICE — GLOVE SURG SZ 8 L12IN FNGR THK79MIL GRN LTX FREE

## (undated) DEVICE — GOWN,NON-REINFORCED,XXL: Brand: MEDLINE

## (undated) DEVICE — SOLUTION IRRIG 500ML 0.9% SOD CHLO USP POUR PLAS BTL

## (undated) DEVICE — SYRINGE IRRIG 60ML SFT PLIABLE BLB EZ TO GRP 1 HND USE W/

## (undated) DEVICE — JELLY LUBRICATING 2 OZ SCREW-CAP MTL TUBE STRL SURGLUB

## (undated) DEVICE — GARMENT,MEDLINE,DVT,INT,CALF,MED, GEN2: Brand: MEDLINE

## (undated) DEVICE — SOUTHSIDE TURNOVER: Brand: MEDLINE INDUSTRIES, INC.

## (undated) DEVICE — MINOR GENERAL PACK: Brand: MEDLINE INDUSTRIES, INC.

## (undated) DEVICE — TAPE,CLOTH/SILK,CURAD,3"X10YD,LF,40/CS: Brand: CURAD

## (undated) DEVICE — HEAD FRAME WITH SOFT LAYER FOAM POSITIONER: Brand: CARDINAL HEALTH

## (undated) DEVICE — WET SKIN PREP TRAY: Brand: MEDLINE INDUSTRIES, INC.

## (undated) DEVICE — LAMINECTOMY ARM CRADLE FOAM POSITIONER: Brand: CARDINAL HEALTH